# Patient Record
Sex: MALE | Race: WHITE | URBAN - METROPOLITAN AREA
[De-identification: names, ages, dates, MRNs, and addresses within clinical notes are randomized per-mention and may not be internally consistent; named-entity substitution may affect disease eponyms.]

---

## 2017-08-09 ENCOUNTER — HOSPITAL ENCOUNTER (OUTPATIENT)
Dept: CARDIOLOGY | Facility: CLINIC | Age: 62
Discharge: HOME | End: 2017-08-09
Admitting: RADIOLOGY
Payer: MEDICARE

## 2017-08-09 DIAGNOSIS — Z13.6 SCREENING FOR HEART DISEASE: ICD-10-CM

## 2017-08-09 PROCEDURE — 75571 CT HRT W/O DYE W/CA TEST: CPT

## 2017-08-09 PROCEDURE — 75571 CT HRT W/O DYE W/CA TEST: CPT | Mod: 26 | Performed by: INTERNAL MEDICINE

## 2017-08-14 NOTE — PROGRESS NOTES
08/14/2017 0852  Calcium score called to pt.  Pt verbalized understanding.  Encouraged follow up appointment with primary   Notifed pt that they will receive report in the mail

## 2017-08-14 NOTE — PROGRESS NOTES
Calcium score result sent to patient by certified mail. Patient does not list a PCP in Epic. Michelle Hall, CV Imaging Manager

## 2021-12-21 ENCOUNTER — APPOINTMENT (OUTPATIENT)
Dept: URBAN - METROPOLITAN AREA CLINIC 254 | Age: 66
Setting detail: DERMATOLOGY
End: 2021-12-21

## 2021-12-21 VITALS — HEIGHT: 74 IN | WEIGHT: 240 LBS | RESPIRATION RATE: 14 BRPM

## 2021-12-21 DIAGNOSIS — L71.8 OTHER ROSACEA: ICD-10-CM

## 2021-12-21 PROCEDURE — OTHER PRESCRIPTION: OTHER

## 2021-12-21 PROCEDURE — OTHER MIPS QUALITY: OTHER

## 2021-12-21 PROCEDURE — OTHER COUNSELING: OTHER

## 2021-12-21 PROCEDURE — OTHER PRESCRIPTION MEDICATION MANAGEMENT: OTHER

## 2021-12-21 PROCEDURE — 99204 OFFICE O/P NEW MOD 45 MIN: CPT

## 2021-12-21 RX ORDER — METRONIDAZOLE 7.5 MG/G
CREAM TOPICAL BID
Qty: 45 | Refills: 3 | Status: ERX | COMMUNITY
Start: 2021-12-21

## 2021-12-21 RX ORDER — DOXYCYCLINE HYCLATE 50 MG/1
CAPSULE, GELATIN COATED ORAL
Qty: 30 | Refills: 1 | Status: ERX | COMMUNITY
Start: 2021-12-21

## 2021-12-21 NOTE — PROCEDURE: PRESCRIPTION MEDICATION MANAGEMENT
Detail Level: Zone
Samples Given: Sensitive skin lotion, Eucerin, CeraVe
Initiate Treatment: Doxycycline 50mg once daily, sulfur soap daily. Patient will buy from [employer]. Metronidazole cream 0.75% bid to face.
Render In Strict Bullet Format?: No